# Patient Record
Sex: MALE | Race: WHITE
[De-identification: names, ages, dates, MRNs, and addresses within clinical notes are randomized per-mention and may not be internally consistent; named-entity substitution may affect disease eponyms.]

---

## 2018-02-20 PROBLEM — Z00.00 ENCOUNTER FOR PREVENTIVE HEALTH EXAMINATION: Status: ACTIVE | Noted: 2018-02-20

## 2018-03-09 ENCOUNTER — APPOINTMENT (OUTPATIENT)
Dept: ORTHOPEDIC SURGERY | Facility: CLINIC | Age: 73
End: 2018-03-09
Payer: MEDICARE

## 2018-03-09 VITALS — DIASTOLIC BLOOD PRESSURE: 69 MMHG | SYSTOLIC BLOOD PRESSURE: 154 MMHG | HEART RATE: 65 BPM

## 2018-03-09 VITALS — BODY MASS INDEX: 36.32 KG/M2 | WEIGHT: 205 LBS | HEIGHT: 63 IN

## 2018-03-09 DIAGNOSIS — S32.009A UNSPECIFIED FRACTURE OF UNSPECIFIED LUMBAR VERTEBRA, INITIAL ENCOUNTER FOR CLOSED FRACTURE: ICD-10-CM

## 2018-03-09 PROCEDURE — 73502 X-RAY EXAM HIP UNI 2-3 VIEWS: CPT | Mod: RT

## 2018-03-09 PROCEDURE — 99203 OFFICE O/P NEW LOW 30 MIN: CPT

## 2018-06-30 ENCOUNTER — RECORD ABSTRACTING (OUTPATIENT)
Age: 73
End: 2018-06-30

## 2018-06-30 DIAGNOSIS — Z86.69 PERSONAL HISTORY OF OTHER DISEASES OF THE NERVOUS SYSTEM AND SENSE ORGANS: ICD-10-CM

## 2018-06-30 DIAGNOSIS — I10 ESSENTIAL (PRIMARY) HYPERTENSION: ICD-10-CM

## 2018-07-19 ENCOUNTER — APPOINTMENT (OUTPATIENT)
Dept: PULMONOLOGY | Facility: CLINIC | Age: 73
End: 2018-07-19

## 2021-04-09 ENCOUNTER — APPOINTMENT (OUTPATIENT)
Dept: ORTHOPEDIC SURGERY | Facility: CLINIC | Age: 76
End: 2021-04-09
Payer: MEDICARE

## 2021-04-09 DIAGNOSIS — M17.12 UNILATERAL PRIMARY OSTEOARTHRITIS, LEFT KNEE: ICD-10-CM

## 2021-04-09 PROCEDURE — 99072 ADDL SUPL MATRL&STAF TM PHE: CPT

## 2021-04-09 PROCEDURE — 99204 OFFICE O/P NEW MOD 45 MIN: CPT

## 2021-04-09 RX ORDER — MELOXICAM 15 MG/1
15 TABLET ORAL DAILY
Qty: 30 | Refills: 0 | Status: ACTIVE | COMMUNITY
Start: 2021-04-09 | End: 1900-01-01

## 2021-04-09 NOTE — DISCUSSION/SUMMARY
[de-identified] : Impression; his posture arthritis left knee\par Plan physical therapy, Mobic 15 mg daily, follow up 2 weeks. If not improved will undergo injection therapy

## 2021-04-09 NOTE — PHYSICAL EXAM
[de-identified] : Constitutional:Well nourished , well developed and in no acute distress\par Psychiatric: Alert and oriented to time place and person.Appropriate affect\par Respiratory: Unlabored respirations,no audible wheezing\par Cardiovascular: no leg swelling  ankle edema\par Vascular: no calf or thigh tenderness, \par Peripheral pulses; intact\par Skin:Head, neck, arms and lower extremities:no lesions or discoloration\par Lymphatics:No groin adenopathy\par Neurological: intact light touch sensation and grossly intact coordination and motor power.\par Knee; left no effusion flexion limited 0/120 moderate-to-marked tenderness medial joint line peripheral pulses intact [de-identified] : Outside x-rays left knee 3 views April 5, 2021 revealed decreased medial compartment joint space

## 2021-04-09 NOTE — CONSULT LETTER
[Dear  ___] : Dear  [unfilled], [Consult Letter:] : I had the pleasure of evaluating your patient, [unfilled]. [Please see my note below.] : Please see my note below. [Consult Closing:] : Thank you very much for allowing me to participate in the care of this patient.  If you have any questions, please do not hesitate to contact me. [Sincerely,] : Sincerely, [FreeTextEntry2] : Xiao Brooks [FreeTextEntry3] : Joao Dee MD\par

## 2021-04-09 NOTE — HISTORY OF PRESENT ILLNESS
[de-identified] : 75-year-old male complaining of spontaneous onset of pain medial aspect left knee that began 2 months ago. Symptoms provoked by going downstairs more than up stairs and walking. Denies swelling or locking. Obtains some relief and responds to ibuprofen and extra strength Excedrin. Has a multiyear history of episodic pain left knee going upstairs with a sense of impending locking which resolves quickly after he "straightened it" denies back or hip pain

## 2022-05-27 ENCOUNTER — RX RENEWAL (OUTPATIENT)
Age: 77
End: 2022-05-27

## 2022-05-27 RX ORDER — MELOXICAM 15 MG/1
15 TABLET ORAL DAILY
Qty: 90 | Refills: 3 | Status: ACTIVE | COMMUNITY
Start: 2021-04-09 | End: 1900-01-01